# Patient Record
Sex: MALE | Employment: STUDENT | URBAN - METROPOLITAN AREA
[De-identification: names, ages, dates, MRNs, and addresses within clinical notes are randomized per-mention and may not be internally consistent; named-entity substitution may affect disease eponyms.]

---
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
## 2025-01-13 ENCOUNTER — OFFICE VISIT (OUTPATIENT)
Dept: ORTHOPEDIC SURGERY | Facility: HOSPITAL | Age: 20
End: 2025-01-13
Payer: COMMERCIAL

## 2025-01-13 DIAGNOSIS — S86.011D RUPTURE OF RIGHT ACHILLES TENDON, SUBSEQUENT ENCOUNTER: Primary | ICD-10-CM

## 2025-01-13 PROCEDURE — 99211 OFF/OP EST MAY X REQ PHY/QHP: CPT | Performed by: PHYSICIAN ASSISTANT

## 2025-01-13 PROCEDURE — 1036F TOBACCO NON-USER: CPT | Performed by: PHYSICIAN ASSISTANT

## 2025-01-13 ASSESSMENT — PAIN - FUNCTIONAL ASSESSMENT: PAIN_FUNCTIONAL_ASSESSMENT: 0-10

## 2025-01-13 ASSESSMENT — PAIN SCALES - GENERAL: PAINLEVEL_OUTOF10: 1

## 2025-01-13 NOTE — PROGRESS NOTES
Subjective    Patient ID: Bran Yang is a 19 y.o. male.    Procedure: Right Achilles tendon repair  Date of surgery: 11/7/2024      HPI:  Bran Yang is a 19 y.o. male who is status post 9 weeks right Achilles tendon repair.  No problems or adverse events reported.  He is now fully weightbearing in the boot without any difficulty.  He reports minimal discomfort.    ROS  Constitutional: No fever, no chills, not feeling tired, no recent weight gain and no recent weight loss  ENT: No nosebleeds  Cardiovascular: No chest pain  Respiratory: No shortness of breath and no cough  Gastrointestinal: No abdominal pain, no nausea, no diarrhea, and no vomiting  Musculoskeletal: No arthralgias  Integumentary: No rashes and no skin lesions  Neurological: No headache  Psychiatric: No sleep disturbances no depression  Endocrine: No muscle weakness and no muscle cramps  Hematologic/lymphatic: No swelling glands and no tendency for easy bruising      Objective   19-year-old male well appearing in no acute distress. Alert and oriented ×3.  Skin intact bilateral lower extremities.   Normal tandem gait. Coordination and balance intact.  Bilateral lower extremity compartments supple.  5 out of 5 distal motor strength bilaterally.  L4 through S1 sensation intact bilaterally.  2+ DP/PT pulses bilaterally.  The incision over the right Achilles is healing nicely with minimal scarring.  The repair itself is palpable and intact.  Negative Bui sign.        Assessment/Plan   Encounter Diagnoses:  Rupture of right Achilles tendon, subsequent encounter    No orders of the defined types were placed in this encounter.      He may discontinue the boot.  I recommend that he use tennis shoes consistently.  He can place gel heel cups in both shoes.  He may have a little bit of increased soreness when transitioning out of the boot.  He can use ice and over-the-counter medicines.  He is scheduled to start physical therapy this week.   Avoid dorsiflexion past neutral until least 3 months postop.  Will see him back in follow-up in 2 months.    This office note was dictated using Dragon voice to text software and was not proofread for spelling or grammatical errors

## 2025-01-15 ENCOUNTER — EVALUATION (OUTPATIENT)
Dept: PHYSICAL THERAPY | Facility: CLINIC | Age: 20
End: 2025-01-15
Payer: COMMERCIAL

## 2025-01-15 DIAGNOSIS — S86.011D RUPTURE OF RIGHT ACHILLES TENDON, SUBSEQUENT ENCOUNTER: Primary | ICD-10-CM

## 2025-01-15 DIAGNOSIS — R29.898 ANKLE WEAKNESS: ICD-10-CM

## 2025-01-15 DIAGNOSIS — M25.671 STIFFNESS OF RIGHT ANKLE JOINT: ICD-10-CM

## 2025-01-15 PROCEDURE — 97110 THERAPEUTIC EXERCISES: CPT | Mod: GP

## 2025-01-15 PROCEDURE — 97161 PT EVAL LOW COMPLEX 20 MIN: CPT | Mod: GP

## 2025-01-15 NOTE — PROGRESS NOTES
Physical Therapy  Physical Therapy Orthopedic Evaluation    Patient Name: Bran Yang  MRN: [id number]  Today's Date: 1/15/2025    Insurance:  Visit number: 1 of [place]  Authorization info: No  Insurance Type: Payor: [employer] [facility] / Plan: [employer] / Product Type: *No Product type* /    Current Problem  Problem List Items Addressed This Visit             ICD-10-CM    Achilles rupture, right S86.011A    Stiffness of right ankle joint M25.671    Ankle weakness - Primary R29.898     General:  General  Reason for Referral: R achilles repair  Referred By: Dr. Ha  General Comment: DOS: 11/7/24  Precautions:     Medical History Form: Reviewed (scanned into chart)    Subjective:   MADELYN: Pt goes by Darvin. Pt reports to evaluation today 10 weeks post op of R achiles tendon repair. Pt was playing C4M ball when he felt a pop in the back of his calf. Pt is currently walking without a a boot but has an antalgic gait pattern and locking out his knee. He has not been having any pain but it will get achy from time to time when he is sitting for long periods of time. His incision is healing nicely with no swelling.     Previous Med Management: rest, Ice prior to surgery    PMH: Nothing related to his ankle     Aggravating Factors: putting pressure in his heel and going into dorsiflexion    Relieving Factors: rest     Pain/Symptom Scale:  Current: 0/10  Worst: 5/10  Best: 0/10  Location/Nature: Posterior ankle and describes as a dull ache   Meds: None     PLOF: Prior to surgery pt had no problems   ADL: Pt has to be cautious when showering   Work: Works for a  company   Athletics: Pickle ball and workout routine   Recreation/ Hobbies: Video games and legos     Goals: Pt would like to get back to all activities     Language: English    Objective:     Palpation / Observation   Slight increase in tenderness upon palpation of R achilles tendon. Incision healing nicely. No swelling noted. Pt  ambulating out of boot with antalgic gait pattern.     Ankle ROM   DF- R: 0 L: 20  PF- R: 30 L: 30  Eversion - R: 15 L: 20   Inversion - R: 20 L: 30    Ankle MMT  DF- R: Not tested due to recent surgery L: 5/5  PF- R: Not tested due to recent surgery L: 5/5  Eversion - R: Not tested due to recent surgery L: 5/5   Inversion - R: Not tested due to recent surgery L: 5/5    Outcome Measures:  Other Measures  Lower Extremity Funtional Score (LEFS): 21/80     Treatments:  Access Code: [email or web address]  URL: [email or web address]  Date: 01/15/2025  Prepared by: Tigre Kwong    Exercises  - Isometric Ankle Dorsiflexion and Plantarflexion  - 1 x daily - 7 x weekly - 3 sets - 10 reps  - Isometric Ankle Inversion at Wall  - 1 x daily - 7 x weekly - 3 sets - 10 reps  - Isometric Ankle Eversion at Wall  - 1 x daily - 7 x weekly - 3 sets - 10 reps  - Long Sitting Isometric Ankle Plantarflexion with Ball at Wall  - 1 x daily - 7 x weekly - 3 sets - 10 reps  - Standing Hip Abduction AROM  - 1 x daily - 7 x weekly - 3 sets - 10 reps  - Towel Scrunches  - 1 x daily - 7 x weekly - 3 sets - 10 reps    EDUCATION: Home exercise program, plan of care, activity modifications, pain management, and injury pathology       Assessment:     Patient is a 19 year old male that presents to physical therapy evaluation today 10 weeks post op of R achilles tendon repair. Patient impairments include flexibility deficits of R ankle, strength deficits in R LE musculature, and motor control deficits including lack of ankle control when ambulating. Due to these impairments, the patients has the following functional limitations: pain with sitting for long periods of time, difficulty walking with non antalgic gait pattern, and an overall decrease in functional mobility. Skilled therapy recommended in order to to address their impairments and progress towards the associated functional goals. Prognosis is excellent secondary to their current  presentation and client understanding. The pt demonstrates a good understanding of their current plan of care, rehab expectations, and prognosis.          Plan:     Planned Interventions include: therapeutic exercise, self-care home management, manual therapy, therapeutic activities, gait training, neuromuscular coordination, vasopneumatic, dry needling, aquatic therapy  Frequency: 1-2 x Week  Duration: 12 Weeks  Goals: Set and discussed today  Active       PT Problem       PT Goals       Start:  01/15/25       1.  Patient will increase LEFS to 65 out of 80 or greater in order to demonstrate an increase in ankle function  2.  Patient will increase all hip and ankle manual muscle tests to 4+ out of 5 or greater in order to demonstrate an increase in lower extremity strength.  3.  Patient will increase right ankle ROM to be equal to or greater than the L ankle in order to demonstrate an increase in functional mobility.  4.  Patient will be able to perform 15 or more heel raises without upper extremity support in order to demonstrate an increase in functional strength.  5.  Patient will be able to walk 0.5 of a mile with no increase in right ankle/achilles pain in order to demonstrate progress towards prior level of function.  6.  Patient will perform home exercise program independently and compliantly               Plan of care was developed with input and agreement by the patient  Ambulatory Screenings Summary       Screening  Frequency  Date Last Completed   Spiritual and Cultural Beliefs   Screening each visit or episode of care    Falls Risk Screening every ambulatory visit    Pain Screening annually at primary care visit  10/28/2024   Domestic Violence screening annually at primary care visit    Depression Screening annually in the primary care setting    Suicide Risk Screening annually in the primary care setting    Nutrition and Food Insecurity   Screening at least annually at primary care visit     Key Learner  annually in the primary care setting    Drug Screen  1/13/2025 10:16 AM   Alcohol Screen  1/13/2025 10:16 AM   Advance Directive       Time Calculation  Start Time: 0845  Stop Time: 0930  Time Calculation (min): 45 min  PT Evaluation Time Entry  PT Evaluation (Low) Time Entry: 25 PT Therapeutic Procedures Time Entry  Therapeutic Exercise Time Entry: 15

## 2025-01-17 ENCOUNTER — TREATMENT (OUTPATIENT)
Dept: PHYSICAL THERAPY | Facility: CLINIC | Age: 20
End: 2025-01-17
Payer: COMMERCIAL

## 2025-01-17 DIAGNOSIS — S86.011D RUPTURE OF RIGHT ACHILLES TENDON, SUBSEQUENT ENCOUNTER: Primary | ICD-10-CM

## 2025-01-17 DIAGNOSIS — M25.671 STIFFNESS OF RIGHT ANKLE JOINT: ICD-10-CM

## 2025-01-17 DIAGNOSIS — R29.898 ANKLE WEAKNESS: ICD-10-CM

## 2025-01-17 PROCEDURE — 97110 THERAPEUTIC EXERCISES: CPT | Mod: GP

## 2025-01-17 PROCEDURE — 97140 MANUAL THERAPY 1/> REGIONS: CPT | Mod: GP

## 2025-01-17 ASSESSMENT — PAIN SCALES - GENERAL: PAINLEVEL_OUTOF10: 0 - NO PAIN

## 2025-01-17 ASSESSMENT — PAIN - FUNCTIONAL ASSESSMENT: PAIN_FUNCTIONAL_ASSESSMENT: 0-10

## 2025-01-17 NOTE — PROGRESS NOTES
"Physical Therapy  Physical Therapy Treatment    Patient Name: Bran Yang  MRN: [id number]  Today's Date: 1/17/2025    Insurance:  Visit number: 2 of MN  Authorization info: No  Insurance Type: Payor: [facility] / Plan: [employer] / Product Type: *No Product type* /    Current Problem  Problem List Items Addressed This Visit             ICD-10-CM    Achilles rupture, right - Primary S86.011A    Stiffness of right ankle joint M25.671    Ankle weakness R29.898     General:  General  Reason for Referral: R achilles repair  Referred By: Dr. Ha  General Comment: DOS: 11/7/24  Precautions:     Medical History Form: Reviewed (scanned into chart)    Subjective:   Patient reports he is feeling ok overall. Continuing to work on his gait pattern since weaning out of the boot. Has noticed some arch pain/tightness as well as toe spasms the past few days.     Objective:     Palpation / Observation   Slight increase in tenderness upon palpation of R achilles tendon. Incision healing nicely. No swelling noted. Pt ambulating out of boot with antalgic gait pattern.     Ankle ROM   DF- R: 0 L: 20  PF- R: 30 L: 30  Eversion - R: 15 L: 20   Inversion - R: 20 L: 30    Ankle MMT  DF- R: Not tested due to recent surgery L: 5/5  PF- R: Not tested due to recent surgery L: 5/5  Eversion - R: Not tested due to recent surgery L: 5/5   Inversion - R: Not tested due to recent surgery L: 5/5    Outcome Measures:        Treatments:    Manual Therapy: 20'  STM gastroc/soleus  Trp gastroc/soleus  PROM to tolerance not aggressive DF  TCJ grade 1-2 AP glides   TCJ distraction  Subtalar distraction w/ inversion/eversion wiggle to tolerance    There-ex: 25'  Toe yoga 2x10 alt greater and lesser toe lifts each*  Seated heel raises 3x10  DF self mobs to 4\" step to tolerance not aggressive w/ 1-2\" holds 2x20*  Mini squats w/ heel wedge 2x10  Semi-tandem stance cross body reaches to plinth 2x20    Access Code: " [email or web address]  URL: [email or web address]  Date: 01/15/2025  Prepared by: Tigre Kwong    EDUCATION: Home exercise program, plan of care, activity modifications, pain management, and injury pathology       Assessment:   Patient tolerated today's session w/ a reported reduction in tightness and improved gait at the conclusion of session. He was able to ambulate w/o external rotating his affected LE. Patient did well w/ new exercises introduced in today's session. He will benefit from ongoing PT services to continue to progress ankle ROM, as well as LE strengthening activities as tolerated per post-op achilles repair protocol to reach established goals to return to PLOF.        Plan:   Assess response to previous session. Continue manual, ROM, strengthening and balance exercises as tolerated. Trial of ankle isotonics and blaze pod balance if appropriate.     Goals: Set and discussed today  Active       PT Problem       PT Goals       Start:  01/15/25       1.  Patient will increase LEFS to 65 out of 80 or greater in order to demonstrate an increase in ankle function  2.  Patient will increase all hip and ankle manual muscle tests to 4+ out of 5 or greater in order to demonstrate an increase in lower extremity strength.  3.  Patient will increase right ankle ROM to be equal to or greater than the L ankle in order to demonstrate an increase in functional mobility.  4.  Patient will be able to perform 15 or more heel raises without upper extremity support in order to demonstrate an increase in functional strength.  5.  Patient will be able to walk 0.5 of a mile with no increase in right ankle/achilles pain in order to demonstrate progress towards prior level of function.  6.  Patient will perform home exercise program independently and compliantly               Plan of care was developed with input and agreement by the patient  Ambulatory Screenings Summary       Screening  Frequency  Date Last  Completed   Spiritual and Cultural Beliefs   Screening each visit or episode of care    Falls Risk Screening every ambulatory visit    Pain Screening annually at primary care visit  10/28/2024   Domestic Violence screening annually at primary care visit    Depression Screening annually in the primary care setting    Suicide Risk Screening annually in the primary care setting    Nutrition and Food Insecurity   Screening at least annually at primary care visit     Key Learner annually in the primary care setting    Drug Screen  1/13/2025 10:16 AM   Alcohol Screen  1/13/2025 10:16 AM   Advance Directive       Time Calculation  Start Time: 1115  Stop Time: 1200  Time Calculation (min): 45 min    PT Therapeutic Procedures Time Entry  Manual Therapy Time Entry: 20  Therapeutic Exercise Time Entry: 25

## 2025-01-21 ENCOUNTER — TREATMENT (OUTPATIENT)
Dept: PHYSICAL THERAPY | Facility: CLINIC | Age: 20
End: 2025-01-21
Payer: COMMERCIAL

## 2025-01-21 DIAGNOSIS — S86.011D RUPTURE OF RIGHT ACHILLES TENDON, SUBSEQUENT ENCOUNTER: Primary | ICD-10-CM

## 2025-01-21 DIAGNOSIS — M25.671 STIFFNESS OF RIGHT ANKLE JOINT: ICD-10-CM

## 2025-01-21 DIAGNOSIS — R29.898 ANKLE WEAKNESS: ICD-10-CM

## 2025-01-21 PROCEDURE — 97110 THERAPEUTIC EXERCISES: CPT | Mod: GP

## 2025-01-21 PROCEDURE — 97140 MANUAL THERAPY 1/> REGIONS: CPT | Mod: GP

## 2025-01-21 NOTE — PROGRESS NOTES
Physical Therapy  Physical Therapy Treatment    Patient Name: Bran Yang  MRN: [id number]  Today's Date: 1/21/2025    Insurance:  Visit number: 2 of MN  Authorization info: No  Insurance Type: Payor: [facility] / Plan: [facility] [employer] / Product Type: *No Product type* /    Current Problem  Problem List Items Addressed This Visit             ICD-10-CM    Achilles rupture, right - Primary S86.011A    Stiffness of right ankle joint M25.671    Ankle weakness R29.898     General:  General  Reason for Referral: R achilles repair  Referred By: Dr. Ha  General Comment: DOS: 11/7/24  Precautions:     Medical History Form: Reviewed (scanned into chart)    Subjective:   Patient has been having some gastroc/posterior ankle pain. He has been walking with a better gait pattern. HEP is going well.     Objective:     Palpation / Observation   Slight increase in tenderness upon palpation of R achilles tendon. Incision healing nicely. No swelling noted. Pt ambulating out of boot with antalgic gait pattern.     Ankle ROM   DF- R: 0 L: 20  PF- R: 30 L: 30  Eversion - R: 15 L: 20   Inversion - R: 20 L: 30    Ankle MMT  DF- R: Not tested due to recent surgery L: 5/5  PF- R: Not tested due to recent surgery L: 5/5  Eversion - R: Not tested due to recent surgery L: 5/5   Inversion - R: Not tested due to recent surgery L: 5/5    Outcome Measures:        Treatments:    Manual Therapy: 20'  STM gastroc/soleus  Trp gastroc/soleus  PROM to tolerance not aggressive DF  TCJ grade 1-2 AP glides   TCJ distraction  Subtalar distraction w/ inversion/eversion wiggle to tolerance    There-ex: 25'  Long sitting DF stretch 3 x 40 sec   Banded DF (red) 2 x 10   Banded PF (red) 2 x 10   Banded eversion (red) 2 x 10   5 min upright bike     Access Code: [email or web address]  URL: [email or web address]  Date: 01/15/2025  Prepared by: Tigre Kwong    EDUCATION: Home exercise program, plan of care, activity  modifications, pain management, and injury pathology       Assessment:   Patient tolerated today's session well. Pt was able to continue to work on DF flexibility with long sustained holds. Pt was also able to begin light banded isotonics without any pain. Pt continues to progress towards goals established in the POC and should continue with skilled therapy.         Plan:   Assess response to previous session. Continue manual, ROM, strengthening and balance exercises as tolerated. Trial of ankle isotonics and blaze pod balance if appropriate.     Goals: Set and discussed today  Active       PT Problem       PT Goals       Start:  01/15/25       1.  Patient will increase LEFS to 65 out of 80 or greater in order to demonstrate an increase in ankle function  2.  Patient will increase all hip and ankle manual muscle tests to 4+ out of 5 or greater in order to demonstrate an increase in lower extremity strength.  3.  Patient will increase right ankle ROM to be equal to or greater than the L ankle in order to demonstrate an increase in functional mobility.  4.  Patient will be able to perform 15 or more heel raises without upper extremity support in order to demonstrate an increase in functional strength.  5.  Patient will be able to walk 0.5 of a mile with no increase in right ankle/achilles pain in order to demonstrate progress towards prior level of function.  6.  Patient will perform home exercise program independently and compliantly               Plan of care was developed with input and agreement by the patient  Ambulatory Screenings Summary       Screening  Frequency  Date Last Completed   Spiritual and Cultural Beliefs   Screening each visit or episode of care    Falls Risk Screening every ambulatory visit    Pain Screening annually at primary care visit  10/28/2024   Domestic Violence screening annually at primary care visit    Depression Screening annually in the primary care setting    Suicide Risk Screening  annually in the primary care setting    Nutrition and Food Insecurity   Screening at least annually at primary care visit     Key Learner annually in the primary care setting    Drug Screen  1/13/2025 10:16 AM   Alcohol Screen  1/13/2025 10:16 AM   Advance Directive       Time Calculation  Start Time: 0845  Stop Time: 0930  Time Calculation (min): 45 min    PT Therapeutic Procedures Time Entry  Manual Therapy Time Entry: 25  Therapeutic Exercise Time Entry: 16

## 2025-01-23 ENCOUNTER — TREATMENT (OUTPATIENT)
Dept: PHYSICAL THERAPY | Facility: CLINIC | Age: 20
End: 2025-01-23
Payer: COMMERCIAL

## 2025-01-23 DIAGNOSIS — S86.011D RUPTURE OF RIGHT ACHILLES TENDON, SUBSEQUENT ENCOUNTER: Primary | ICD-10-CM

## 2025-01-23 DIAGNOSIS — M25.671 STIFFNESS OF RIGHT ANKLE JOINT: ICD-10-CM

## 2025-01-23 DIAGNOSIS — R29.898 ANKLE WEAKNESS: ICD-10-CM

## 2025-01-23 PROCEDURE — 97140 MANUAL THERAPY 1/> REGIONS: CPT | Mod: GP

## 2025-01-23 PROCEDURE — 97110 THERAPEUTIC EXERCISES: CPT | Mod: GP

## 2025-01-23 ASSESSMENT — PAIN - FUNCTIONAL ASSESSMENT: PAIN_FUNCTIONAL_ASSESSMENT: 0-10

## 2025-01-23 ASSESSMENT — PAIN SCALES - GENERAL: PAINLEVEL_OUTOF10: 3

## 2025-01-23 NOTE — PROGRESS NOTES
"Physical Therapy  Physical Therapy Treatment    Patient Name: Bran Yang  MRN: [id number]  Today's Date: 1/23/2025    Insurance:  Visit number: 4 of [place]  Authorization info: No  Insurance Type: Payor: [facility] / Plan: [employer] / Product Type: *No Product type* /    Current Problem  Problem List Items Addressed This Visit             ICD-10-CM    Achilles rupture, right - Primary S86.011A    Stiffness of right ankle joint M25.671    Ankle weakness R29.898     General:  General  Reason for Referral: R achilles repair  Referred By: Dr. Ha  General Comment: DOS: 11/7/24  Precautions:     Medical History Form: Reviewed (scanned into chart)    Subjective:   Patient reports he is feeling ok today. Had to walk outside for class today and has been having some sharp discomfort in his achilles today.     Objective:     Pain: 2-3, more sharp today from walking outside.    Palpation / Observation   Slight increase in tenderness upon palpation of R achilles tendon. Incision healing nicely. No swelling noted. Pt ambulating out of boot with antalgic gait pattern.     Ankle ROM   DF- R: 2-3 L: 20 updated 1/23 post session  PF- R: 30 L: 30  Eversion - R: 15 L: 20   Inversion - R: 20 L: 30    Ankle MMT  DF- R: Not tested due to recent surgery L: 5/5  PF- R: Not tested due to recent surgery L: 5/5  Eversion - R: Not tested due to recent surgery L: 5/5   Inversion - R: Not tested due to recent surgery L: 5/5    Outcome Measures:        Treatments:    Manual Therapy: 25'  STM gastroc/soleus  Trp gastroc/soleus  TCJ grade 3-4 AP glides   TCJ distraction  IASTM achilles tendon w/ Graston tool   Achilles motility to tolerance    There-ex: 25'  Upright bike x5'   Long sitting gastroc soleus stretch 2x30\"  Long sitting ankle eversion w/ green perform better TB 2x10  Ankle DF self mobs to plinth w/ 10\" holds x6*  Slant board gastroc stretch w/ 15\" holds x4, knee flexed and extended to tolerance  Slant " "board squats w/ 10# KB goblet hold 3x12  Knee extension isometrics in seated x30\"*    * = added to HEP.  Sheet with exercise descriptions and images issued.  Skilled intervention utilized in the appropriate selection & application of above exercises.  Verbal and tactile cues provided for proper form and technique.  Pt. demonstrated appropriate form & verbalized understanding of optimal technique for above exercises.      Access Code: [email or web address]  URL: [email or web address]  Date: 01/15/2025  Prepared by: Tigre Kwong    EDUCATION: Home exercise program, plan of care, activity modifications, pain management, and injury pathology       Assessment:   Patient tolerated today's session w/ a reported reduction in familiar symptoms and improved gait at the conclusion of session. Demonstrates improvements in DF ROM quality. Had difficulty w/ squats due to weight shifting off of surgical side, which was able to improve w/ visual and verbal cues. Has also been experiencing some patellar tendon pain on ipsilateral side, likely due to hyperextending his knee during gait compensations. Patient will benefit from ongoing PT services to continue to progress ankle mobility, as well as LE strengthening as tolerated per post-op protocol to reach established goals to return to PLOF.          Plan:   Continue manual and mobility work. Progress balance and hip strengthening activities as tolerated. Reassess patellar tendon pain and response to isometrics.     Goals: Set and discussed today  Active       PT Problem       PT Goals       Start:  01/15/25       1.  Patient will increase LEFS to 65 out of 80 or greater in order to demonstrate an increase in ankle function  2.  Patient will increase all hip and ankle manual muscle tests to 4+ out of 5 or greater in order to demonstrate an increase in lower extremity strength.  3.  Patient will increase right ankle ROM to be equal to or greater than the L ankle in order to " demonstrate an increase in functional mobility.  4.  Patient will be able to perform 15 or more heel raises without upper extremity support in order to demonstrate an increase in functional strength.  5.  Patient will be able to walk 0.5 of a mile with no increase in right ankle/achilles pain in order to demonstrate progress towards prior level of function.  6.  Patient will perform home exercise program independently and compliantly               Plan of care was developed with input and agreement by the patient  Ambulatory Screenings Summary       Screening  Frequency  Date Last Completed   Spiritual and Cultural Beliefs   Screening each visit or episode of care    Falls Risk Screening every ambulatory visit    Pain Screening annually at primary care visit  10/28/2024   Domestic Violence screening annually at primary care visit    Depression Screening annually in the primary care setting    Suicide Risk Screening annually in the primary care setting    Nutrition and Food Insecurity   Screening at least annually at primary care visit     Key Learner annually in the primary care setting    Drug Screen  1/13/2025 10:16 AM   Alcohol Screen  1/13/2025 10:16 AM   Advance Directive       Time Calculation  Start Time: 1355  Stop Time: 1445  Time Calculation (min): 50 min    PT Therapeutic Procedures Time Entry  Manual Therapy Time Entry: 25  Therapeutic Exercise Time Entry: 25

## 2025-01-28 ENCOUNTER — TREATMENT (OUTPATIENT)
Dept: PHYSICAL THERAPY | Facility: CLINIC | Age: 20
End: 2025-01-28
Payer: COMMERCIAL

## 2025-01-28 DIAGNOSIS — R29.898 ANKLE WEAKNESS: ICD-10-CM

## 2025-01-28 DIAGNOSIS — S86.011D RUPTURE OF RIGHT ACHILLES TENDON, SUBSEQUENT ENCOUNTER: Primary | ICD-10-CM

## 2025-01-28 DIAGNOSIS — M25.671 STIFFNESS OF RIGHT ANKLE JOINT: ICD-10-CM

## 2025-01-28 PROCEDURE — 97110 THERAPEUTIC EXERCISES: CPT | Mod: GP

## 2025-01-28 PROCEDURE — 97140 MANUAL THERAPY 1/> REGIONS: CPT | Mod: GP

## 2025-01-28 NOTE — PROGRESS NOTES
"Physical Therapy  Physical Therapy Treatment    Patient Name: Bran Yang  MRN: [id number]  Today's Date: 1/28/2025    Insurance:  Visit number: 5 of MN  Authorization info: No  Insurance Type: Payor: [employer] [facility] / Plan: [employer] / Product Type: *No Product type* /    Current Problem  Problem List Items Addressed This Visit             ICD-10-CM    Achilles rupture, right - Primary S86.011A    Stiffness of right ankle joint M25.671    Ankle weakness R29.898     General:  General  Reason for Referral: R achilles repair  Referred By: Dr. Ha  General Comment: DOS: 11/7/24  Precautions:     Medical History Form: Reviewed (scanned into chart)    Subjective:   Patient's walk continues to improve and he is having less discomfort when in standing   Objective:     Pain: 2-3, more sharp today from walking outside.    Palpation / Observation   Slight increase in tenderness upon palpation of R achilles tendon. Incision healing nicely. No swelling noted. Pt ambulating out of boot with antalgic gait pattern.     Ankle ROM   DF- R: 2-3 L: 20 updated 1/23 post session  PF- R: 30 L: 30  Eversion - R: 15 L: 20   Inversion - R: 20 L: 30    Ankle MMT  DF- R: Not tested due to recent surgery L: 5/5  PF- R: Not tested due to recent surgery L: 5/5  Eversion - R: Not tested due to recent surgery L: 5/5   Inversion - R: Not tested due to recent surgery L: 5/5    Outcome Measures:        Treatments:    Manual Therapy: 25'  STM gastroc/soleus  Trp gastroc/soleus  TCJ grade 3-4 AP glides   TCJ distraction  IASTM achilles tendon w/ Graston tool   Achilles motility to tolerance    There-ex: 25'  Upright bike x5'   12\" box DF stretch 2 x 10   Soleus isometric 5 x 30 sec   Staggered stance heel raise with UE support 2 x 10     * = added to HEP.  Sheet with exercise descriptions and images issued.  Skilled intervention utilized in the appropriate selection & application of above exercises.  Verbal and tactile " cues provided for proper form and technique.  Pt. demonstrated appropriate form & verbalized understanding of optimal technique for above exercises.      Access Code: [email or web address]  URL: [email or web address]  Date: 01/15/2025  Prepared by: Tigre Kwong    EDUCATION: Home exercise program, plan of care, activity modifications, pain management, and injury pathology       Assessment:   Patient tolerated today's session well. Pt continues to gain ankle DF each week. Pt was able to continue to progress strengthening of soleus and gastroc in standing. Pt did not some soreness and fatigue by the end of today's session. Pt continues to progress towards goals established in the POC and should continue with skilled therapy.           Plan:   Continue manual and mobility work. Progress balance and hip strengthening activities as tolerated. Reassess patellar tendon pain and response to isometrics.     Goals: Set and discussed today  Active       PT Problem       PT Goals       Start:  01/15/25       1.  Patient will increase LEFS to 65 out of 80 or greater in order to demonstrate an increase in ankle function  2.  Patient will increase all hip and ankle manual muscle tests to 4+ out of 5 or greater in order to demonstrate an increase in lower extremity strength.  3.  Patient will increase right ankle ROM to be equal to or greater than the L ankle in order to demonstrate an increase in functional mobility.  4.  Patient will be able to perform 15 or more heel raises without upper extremity support in order to demonstrate an increase in functional strength.  5.  Patient will be able to walk 0.5 of a mile with no increase in right ankle/achilles pain in order to demonstrate progress towards prior level of function.  6.  Patient will perform home exercise program independently and compliantly               Plan of care was developed with input and agreement by the patient  Ambulatory Screenings Summary        Screening  Frequency  Date Last Completed   Spiritual and Cultural Beliefs   Screening each visit or episode of care    Falls Risk Screening every ambulatory visit    Pain Screening annually at primary care visit  10/28/2024   Domestic Violence screening annually at primary care visit    Depression Screening annually in the primary care setting    Suicide Risk Screening annually in the primary care setting    Nutrition and Food Insecurity   Screening at least annually at primary care visit     Key Learner annually in the primary care setting    Drug Screen  1/13/2025 10:16 AM   Alcohol Screen  1/13/2025 10:16 AM   Advance Directive       Time Calculation  Start Time: 0845  Stop Time: 0930  Time Calculation (min): 45 min    PT Therapeutic Procedures Time Entry  Manual Therapy Time Entry: 25  Therapeutic Exercise Time Entry: 15

## 2025-01-30 ENCOUNTER — TREATMENT (OUTPATIENT)
Dept: PHYSICAL THERAPY | Facility: CLINIC | Age: 20
End: 2025-01-30
Payer: COMMERCIAL

## 2025-01-30 DIAGNOSIS — S86.011D RUPTURE OF RIGHT ACHILLES TENDON, SUBSEQUENT ENCOUNTER: Primary | ICD-10-CM

## 2025-01-30 DIAGNOSIS — M25.671 STIFFNESS OF RIGHT ANKLE JOINT: ICD-10-CM

## 2025-01-30 DIAGNOSIS — R29.898 ANKLE WEAKNESS: ICD-10-CM

## 2025-01-30 PROCEDURE — 97140 MANUAL THERAPY 1/> REGIONS: CPT | Mod: GP

## 2025-01-30 PROCEDURE — 97110 THERAPEUTIC EXERCISES: CPT | Mod: GP

## 2025-01-30 NOTE — PROGRESS NOTES
Physical Therapy  Physical Therapy Treatment    Patient Name: Bran Yang  MRN: [id number]  Today's Date: 1/30/2025    Insurance:  Visit number: 6 of MN  Authorization info: No  Insurance Type: Payor: [employer] [facility] / Plan: [email or web address] / Product Type: *No Product type* /    Current Problem  Problem List Items Addressed This Visit             ICD-10-CM    Achilles rupture, right - Primary S86.011A    Stiffness of right ankle joint M25.671    Ankle weakness R29.898     General:  General  Reason for Referral: R achilles repair  Referred By: Dr. Ha  General Comment: DOS: 11/7/24  Precautions:     Medical History Form: Reviewed (scanned into chart)    Subjective:   Patient continues to have a better gait pattern each week. Pt is getting to neutral a lot easier and having less pain in his calf.   Objective:     Pain: 2-3, more sharp today from walking outside.    Palpation / Observation   Slight increase in tenderness upon palpation of R achilles tendon. Incision healing nicely. No swelling noted. Pt ambulating out of boot with antalgic gait pattern.     Ankle ROM   DF- R: 2-3 L: 20 updated 1/23 post session  PF- R: 30 L: 30  Eversion - R: 15 L: 20   Inversion - R: 20 L: 30    Ankle MMT  DF- R: Not tested due to recent surgery L: 5/5  PF- R: Not tested due to recent surgery L: 5/5  Eversion - R: Not tested due to recent surgery L: 5/5   Inversion - R: Not tested due to recent surgery L: 5/5    Outcome Measures:        Treatments:    Manual Therapy: 25'  STM gastroc/soleus  Trp gastroc/soleus  TCJ grade 3-4 AP glides   TCJ distraction  IASTM achilles tendon w/ Graston tool   Achilles motility to tolerance    There-ex: 25'  Upright bike x5'   Long sitting DF stretch 3 x 30 sec   BL shuttle press (25#) 3 x 10      * = added to HEP.  Sheet with exercise descriptions and images issued.  Skilled intervention utilized in the appropriate selection & application of above exercises.  Verbal and  tactile cues provided for proper form and technique.  Pt. demonstrated appropriate form & verbalized understanding of optimal technique for above exercises.      Access Code: [email or web address]  URL: [email or web address]  Date: 01/15/2025  Prepared by: Tigre Kwong    EDUCATION: Home exercise program, plan of care, activity modifications, pain management, and injury pathology       Assessment:   Patient tolerated today's session well. Pt continues to show improvement with ankle dorsiflexion. He was able to perform passive stretching with shuttle press today. Pt continues to progress towards goals established in the POC and should continue with skilled therapy.             Plan:   Continue manual and mobility work. Progress balance and hip strengthening activities as tolerated. Reassess patellar tendon pain and response to isometrics.     Goals: Set and discussed today  Active       PT Problem       PT Goals       Start:  01/15/25       1.  Patient will increase LEFS to 65 out of 80 or greater in order to demonstrate an increase in ankle function  2.  Patient will increase all hip and ankle manual muscle tests to 4+ out of 5 or greater in order to demonstrate an increase in lower extremity strength.  3.  Patient will increase right ankle ROM to be equal to or greater than the L ankle in order to demonstrate an increase in functional mobility.  4.  Patient will be able to perform 15 or more heel raises without upper extremity support in order to demonstrate an increase in functional strength.  5.  Patient will be able to walk 0.5 of a mile with no increase in right ankle/achilles pain in order to demonstrate progress towards prior level of function.  6.  Patient will perform home exercise program independently and compliantly               Plan of care was developed with input and agreement by the patient  Ambulatory Screenings Summary       Screening  Frequency  Date Last Completed   Spiritual and  Cultural Beliefs   Screening each visit or episode of care    Falls Risk Screening every ambulatory visit    Pain Screening annually at primary care visit  10/28/2024   Domestic Violence screening annually at primary care visit    Depression Screening annually in the primary care setting    Suicide Risk Screening annually in the primary care setting    Nutrition and Food Insecurity   Screening at least annually at primary care visit     Key Learner annually in the primary care setting    Drug Screen  1/13/2025 10:16 AM   Alcohol Screen  1/13/2025 10:16 AM   Advance Directive       Time Calculation  Start Time: 1445  Stop Time: 1530  Time Calculation (min): 45 min    PT Therapeutic Procedures Time Entry  Manual Therapy Time Entry: 25  Therapeutic Exercise Time Entry: 15

## 2025-02-03 ENCOUNTER — TREATMENT (OUTPATIENT)
Dept: PHYSICAL THERAPY | Facility: CLINIC | Age: 20
End: 2025-02-03
Payer: COMMERCIAL

## 2025-02-03 DIAGNOSIS — R29.898 ANKLE WEAKNESS: ICD-10-CM

## 2025-02-03 DIAGNOSIS — S86.011D RUPTURE OF RIGHT ACHILLES TENDON, SUBSEQUENT ENCOUNTER: Primary | ICD-10-CM

## 2025-02-03 DIAGNOSIS — M25.671 STIFFNESS OF RIGHT ANKLE JOINT: ICD-10-CM

## 2025-02-03 PROCEDURE — 97110 THERAPEUTIC EXERCISES: CPT | Mod: GP

## 2025-02-03 PROCEDURE — 97140 MANUAL THERAPY 1/> REGIONS: CPT | Mod: GP

## 2025-02-03 NOTE — PROGRESS NOTES
"Physical Therapy  Physical Therapy Treatment    Patient Name: Bran Yang  MRN: [id number]  Today's Date: 2/3/2025    Insurance:  Visit number: 7 of MN  Authorization info: No  Insurance Type: Payor: [facility] / Plan: [employer] / Product Type: *No Product type* /    Current Problem  Problem List Items Addressed This Visit             ICD-10-CM    Achilles rupture, right - Primary S86.011A    Stiffness of right ankle joint M25.671    Ankle weakness R29.898     General:  General  Reason for Referral: R achilles repair  Referred By: Dr. Ha  General Comment: DOS: 11/7/24  Precautions:     Medical History Form: Reviewed (scanned into chart)    Subjective:   Patient continues to feel like his is making improvements with his achilles tendon. He is over 12 weeks out now.   Objective:     Pain: 2-3, more sharp today from walking outside.    Palpation / Observation   Slight increase in tenderness upon palpation of R achilles tendon. Incision healing nicely. No swelling noted. Pt ambulating out of boot with antalgic gait pattern.     Ankle ROM   DF- R: 2-3 L: 20 updated 1/23 post session  PF- R: 30 L: 30  Eversion - R: 15 L: 20   Inversion - R: 20 L: 30    Ankle MMT  DF- R: Not tested due to recent surgery L: 5/5  PF- R: Not tested due to recent surgery L: 5/5  Eversion - R: Not tested due to recent surgery L: 5/5   Inversion - R: Not tested due to recent surgery L: 5/5    Outcome Measures:        Treatments:    Manual Therapy: 25'  STM gastroc/soleus  Trp gastroc/soleus  TCJ grade 3-4 AP glides   TCJ distraction  IASTM achilles tendon w/ Graston tool   Achilles motility to tolerance    There-ex: 25'  Upright bike x5'   Long sitting DF stretch 4 x 30 sec   Lunge to 8\" box 2 x 10   TRX BL heel raise 3 x 10     * = added to HEP.  Sheet with exercise descriptions and images issued.  Skilled intervention utilized in the appropriate selection & application of above exercises.  Verbal and " tactile cues provided for proper form and technique.  Pt. demonstrated appropriate form & verbalized understanding of optimal technique for above exercises.      Access Code: [email or web address]  URL: [email or web address]  Date: 01/15/2025  Prepared by: Tgire Kwong    EDUCATION: Home exercise program, plan of care, activity modifications, pain management, and injury pathology       Assessment:   Patient tolerated today's session well. Pt was able to perform lunges to a higher surface with only a slight increase in discomfort. Pt was able to perform heel raises with some light UE assistance. Pt continues to progress towards goals established in the POC and should continue with skilled therapy.             Plan:   Continue manual and mobility work. Progress balance and hip strengthening activities as tolerated. Reassess patellar tendon pain and response to isometrics.     Goals: Set and discussed today  Active       PT Problem       PT Goals       Start:  01/15/25       1.  Patient will increase LEFS to 65 out of 80 or greater in order to demonstrate an increase in ankle function  2.  Patient will increase all hip and ankle manual muscle tests to 4+ out of 5 or greater in order to demonstrate an increase in lower extremity strength.  3.  Patient will increase right ankle ROM to be equal to or greater than the L ankle in order to demonstrate an increase in functional mobility.  4.  Patient will be able to perform 15 or more heel raises without upper extremity support in order to demonstrate an increase in functional strength.  5.  Patient will be able to walk 0.5 of a mile with no increase in right ankle/achilles pain in order to demonstrate progress towards prior level of function.  6.  Patient will perform home exercise program independently and compliantly               Plan of care was developed with input and agreement by the patient  Ambulatory Screenings Summary       Screening  Frequency  Date  Last Completed   Spiritual and Cultural Beliefs   Screening each visit or episode of care    Falls Risk Screening every ambulatory visit    Pain Screening annually at primary care visit  10/28/2024   Domestic Violence screening annually at primary care visit    Depression Screening annually in the primary care setting    Suicide Risk Screening annually in the primary care setting    Nutrition and Food Insecurity   Screening at least annually at primary care visit     Key Learner annually in the primary care setting    Drug Screen  1/13/2025 10:16 AM   Alcohol Screen  1/13/2025 10:16 AM   Advance Directive       Time Calculation  Start Time: 0915  Stop Time: 1000  Time Calculation (min): 45 min    PT Therapeutic Procedures Time Entry  Manual Therapy Time Entry: 25  Therapeutic Exercise Time Entry: 15

## 2025-02-05 ENCOUNTER — TREATMENT (OUTPATIENT)
Dept: PHYSICAL THERAPY | Facility: CLINIC | Age: 20
End: 2025-02-05
Payer: COMMERCIAL

## 2025-02-05 DIAGNOSIS — S86.011D RUPTURE OF RIGHT ACHILLES TENDON, SUBSEQUENT ENCOUNTER: Primary | ICD-10-CM

## 2025-02-05 DIAGNOSIS — R29.898 ANKLE WEAKNESS: ICD-10-CM

## 2025-02-05 DIAGNOSIS — M25.671 STIFFNESS OF RIGHT ANKLE JOINT: ICD-10-CM

## 2025-02-05 PROCEDURE — 97110 THERAPEUTIC EXERCISES: CPT | Mod: GP

## 2025-02-05 PROCEDURE — 97140 MANUAL THERAPY 1/> REGIONS: CPT | Mod: GP

## 2025-02-05 NOTE — PROGRESS NOTES
Physical Therapy  Physical Therapy Treatment    Patient Name: Bran Yang  MRN: [id number]  Today's Date: 2/5/2025    Insurance:  Visit number: 8 of MN  Authorization info: No  Insurance Type: Payor: [employer] [facility] / Plan: [employer] / Product Type: *No Product type* /    Current Problem  Problem List Items Addressed This Visit             ICD-10-CM    Achilles rupture, right - Primary S86.011A    Stiffness of right ankle joint M25.671    Ankle weakness R29.898     General:  General  Reason for Referral: R achilles repair  Referred By: Dr. Ha  General Comment: DOS: 11/7/24  Precautions:     Medical History Form: Reviewed (scanned into chart)    Subjective:   Patient continues to have some mild calf discomfort and weakness but overall is doing well  Objective:     Pain: 2-3, more sharp today from walking outside.    Palpation / Observation   Slight increase in tenderness upon palpation of R achilles tendon. Incision healing nicely. No swelling noted. Pt ambulating out of boot with antalgic gait pattern.     Ankle ROM   DF- R: 2-3 L: 20 updated 1/23 post session  PF- R: 30 L: 30  Eversion - R: 15 L: 20   Inversion - R: 20 L: 30    Ankle MMT  DF- R: Not tested due to recent surgery L: 5/5  PF- R: Not tested due to recent surgery L: 5/5  Eversion - R: Not tested due to recent surgery L: 5/5   Inversion - R: Not tested due to recent surgery L: 5/5    Outcome Measures:        Treatments:    Manual Therapy: 25'  STM gastroc/soleus  Trp gastroc/soleus  TCJ grade 3-4 AP glides   TCJ distraction  Achilles motility to tolerance    There-ex: 15'  Upright bike x5'   TRX squat 2 x 10   3 way slider 3 x 5       * = added to HEP.  Sheet with exercise descriptions and images issued.  Skilled intervention utilized in the appropriate selection & application of above exercises.  Verbal and tactile cues provided for proper form and technique.  Pt. demonstrated appropriate form & verbalized understanding  of optimal technique for above exercises.      Access Code: [email or web address]  URL: [email or web address]  Date: 01/15/2025  Prepared by: Tigre Kwong    EDUCATION: Home exercise program, plan of care, activity modifications, pain management, and injury pathology       Assessment:   Patient tolerated today's session well. Pt continues to make good progress with ankle ROM. Pt was able to perofrm squat activities today without any increase in achilles pain. Pt continues to progress towards goals established in the POC and should continue with skilled therapy.        Plan:   Continue manual and mobility work. Progress balance and hip strengthening activities as tolerated. Reassess patellar tendon pain and response to isometrics.     Goals: Set and discussed today  Active       PT Problem       PT Goals       Start:  01/15/25       1.  Patient will increase LEFS to 65 out of 80 or greater in order to demonstrate an increase in ankle function  2.  Patient will increase all hip and ankle manual muscle tests to 4+ out of 5 or greater in order to demonstrate an increase in lower extremity strength.  3.  Patient will increase right ankle ROM to be equal to or greater than the L ankle in order to demonstrate an increase in functional mobility.  4.  Patient will be able to perform 15 or more heel raises without upper extremity support in order to demonstrate an increase in functional strength.  5.  Patient will be able to walk 0.5 of a mile with no increase in right ankle/achilles pain in order to demonstrate progress towards prior level of function.  6.  Patient will perform home exercise program independently and compliantly               Plan of care was developed with input and agreement by the patient  Ambulatory Screenings Summary       Screening  Frequency  Date Last Completed   Spiritual and Cultural Beliefs   Screening each visit or episode of care    Falls Risk Screening every ambulatory visit    Pain  Screening annually at primary care visit  10/28/2024   Domestic Violence screening annually at primary care visit    Depression Screening annually in the primary care setting    Suicide Risk Screening annually in the primary care setting    Nutrition and Food Insecurity   Screening at least annually at primary care visit     Key Learner annually in the primary care setting    Drug Screen  1/13/2025 10:16 AM   Alcohol Screen  1/13/2025 10:16 AM   Advance Directive       Time Calculation  Start Time: 0930  Stop Time: 1015  Time Calculation (min): 45 min    PT Therapeutic Procedures Time Entry  Manual Therapy Time Entry: 25  Therapeutic Exercise Time Entry: 15

## 2025-02-18 ENCOUNTER — TREATMENT (OUTPATIENT)
Dept: PHYSICAL THERAPY | Facility: CLINIC | Age: 20
End: 2025-02-18
Payer: COMMERCIAL

## 2025-02-18 DIAGNOSIS — M25.671 STIFFNESS OF RIGHT ANKLE JOINT: ICD-10-CM

## 2025-02-18 DIAGNOSIS — S86.011D RUPTURE OF RIGHT ACHILLES TENDON, SUBSEQUENT ENCOUNTER: Primary | ICD-10-CM

## 2025-02-18 DIAGNOSIS — R29.898 ANKLE WEAKNESS: ICD-10-CM

## 2025-02-18 PROCEDURE — 97140 MANUAL THERAPY 1/> REGIONS: CPT | Mod: GP

## 2025-02-18 PROCEDURE — 97110 THERAPEUTIC EXERCISES: CPT | Mod: GP

## 2025-02-18 NOTE — PROGRESS NOTES
Physical Therapy  Physical Therapy Treatment    Patient Name: Bran Yang  MRN: [id number]  Today's Date: 2/18/2025    Insurance:  Visit number: 9 of MN  Authorization info: No  Insurance Type: Payor: [employer] [facility] / Plan: [employer] / Product Type: *No Product type* /    Current Problem  Problem List Items Addressed This Visit             ICD-10-CM    Achilles rupture, right - Primary S86.011A    Stiffness of right ankle joint M25.671    Ankle weakness R29.898     General:  General  Reason for Referral: R achilles repair  Referred By: Dr. Ha  General Comment: DOS: 11/7/24  Precautions:     Medical History Form: Reviewed (scanned into chart)    Subjective:   Patient has continued to have minimal pain in his ankle besides when he took a miss step.   Objective:     Pain: 2-3, more sharp today from walking outside.    Palpation / Observation   Slight increase in tenderness upon palpation of R achilles tendon. Incision healing nicely. No swelling noted. Pt ambulating out of boot with antalgic gait pattern.     Ankle ROM   DF- R: 2-3 L: 20 updated 1/23 post session  PF- R: 30 L: 30  Eversion - R: 15 L: 20   Inversion - R: 20 L: 30    Ankle MMT  DF- R: Not tested due to recent surgery L: 5/5  PF- R: Not tested due to recent surgery L: 5/5  Eversion - R: Not tested due to recent surgery L: 5/5   Inversion - R: Not tested due to recent surgery L: 5/5    Outcome Measures:        Treatments:    Manual Therapy: 25'  STM gastroc/soleus  Trp gastroc/soleus  TCJ grade 3-4 AP glides   TCJ distraction  Achilles motility to tolerance    There-ex: 15'  Upright bike x5'   Reverse slider 3 x 10   BW squats 3 x 10   SL stance with 4 way blaze pod taps 3 x 30 sec       * = added to HEP.  Sheet with exercise descriptions and images issued.  Skilled intervention utilized in the appropriate selection & application of above exercises.  Verbal and tactile cues provided for proper form and technique.  Pt.  demonstrated appropriate form & verbalized understanding of optimal technique for above exercises.      Access Code: [email or web address]  URL: [email or web address]  Date: 01/15/2025  Prepared by: Tigre Kwong    EDUCATION: Home exercise program, plan of care, activity modifications, pain management, and injury pathology       Assessment:   Patient tolerated today's session well. Pt was able to continue to progress all exercises in standing position. Pt has good ROM with all exercises but did have some fatigue after performing SL exercises. Pt continues to progress towards goals established in the POC and should continue with skilled therapy.          Plan:   Continue manual and mobility work. Progress balance and hip strengthening activities as tolerated. Reassess patellar tendon pain and response to isometrics.     Goals: Set and discussed today  Active       PT Problem       PT Goals       Start:  01/15/25       1.  Patient will increase LEFS to 65 out of 80 or greater in order to demonstrate an increase in ankle function  2.  Patient will increase all hip and ankle manual muscle tests to 4+ out of 5 or greater in order to demonstrate an increase in lower extremity strength.  3.  Patient will increase right ankle ROM to be equal to or greater than the L ankle in order to demonstrate an increase in functional mobility.  4.  Patient will be able to perform 15 or more heel raises without upper extremity support in order to demonstrate an increase in functional strength.  5.  Patient will be able to walk 0.5 of a mile with no increase in right ankle/achilles pain in order to demonstrate progress towards prior level of function.  6.  Patient will perform home exercise program independently and compliantly               Plan of care was developed with input and agreement by the patient  Ambulatory Screenings Summary       Screening  Frequency  Date Last Completed   Spiritual and Cultural Beliefs   Screening  each visit or episode of care    Falls Risk Screening every ambulatory visit    Pain Screening annually at primary care visit  10/28/2024   Domestic Violence screening annually at primary care visit    Depression Screening annually in the primary care setting    Suicide Risk Screening annually in the primary care setting    Nutrition and Food Insecurity   Screening at least annually at primary care visit     Key Learner annually in the primary care setting    Drug Screen  1/13/2025 10:16 AM   Alcohol Screen  1/13/2025 10:16 AM   Advance Directive       Time Calculation  Start Time: 0850  Stop Time: 0935  Time Calculation (min): 45 min    PT Therapeutic Procedures Time Entry  Manual Therapy Time Entry: 23  Therapeutic Exercise Time Entry: 17

## 2025-02-26 ENCOUNTER — TREATMENT (OUTPATIENT)
Dept: PHYSICAL THERAPY | Facility: CLINIC | Age: 20
End: 2025-02-26
Payer: COMMERCIAL

## 2025-02-26 DIAGNOSIS — M25.671 STIFFNESS OF RIGHT ANKLE JOINT: ICD-10-CM

## 2025-02-26 DIAGNOSIS — S86.011D RUPTURE OF RIGHT ACHILLES TENDON, SUBSEQUENT ENCOUNTER: Primary | ICD-10-CM

## 2025-02-26 DIAGNOSIS — R29.898 ANKLE WEAKNESS: ICD-10-CM

## 2025-02-26 PROCEDURE — 97140 MANUAL THERAPY 1/> REGIONS: CPT | Mod: GP

## 2025-02-26 PROCEDURE — 97110 THERAPEUTIC EXERCISES: CPT | Mod: GP

## 2025-02-26 NOTE — PROGRESS NOTES
"Physical Therapy  Physical Therapy Treatment    Patient Name: Bran Yang  MRN: [id number]  Today's Date: 2/26/2025    Insurance:  Visit number: 10 of MN  Authorization info: No  Insurance Type: Payor: [facility] / Plan: [employer] / Product Type: *No Product type* /    Current Problem  Problem List Items Addressed This Visit             ICD-10-CM    Achilles rupture, right - Primary S86.011A    Stiffness of right ankle joint M25.671    Ankle weakness R29.898     General:  General  Reason for Referral: R achilles repair  Referred By: Dr. Ha  General Comment: DOS: 11/7/24  Precautions:     Medical History Form: Reviewed (scanned into chart)    Subjective:   Patient continues to feel like he is making good progress with his ankle/ Achilles rehab  Objective:     Pain: 2-3, more sharp today from walking outside.    Palpation / Observation   Slight increase in tenderness upon palpation of R achilles tendon. Incision healing nicely. No swelling noted. Pt ambulating out of boot with antalgic gait pattern.     Ankle ROM   DF- R: 2-3 L: 20 updated 1/23 post session  PF- R: 30 L: 30  Eversion - R: 15 L: 20   Inversion - R: 20 L: 30    Ankle MMT  DF- R: Not tested due to recent surgery L: 5/5  PF- R: Not tested due to recent surgery L: 5/5  Eversion - R: Not tested due to recent surgery L: 5/5   Inversion - R: Not tested due to recent surgery L: 5/5    Outcome Measures:        Treatments:    Manual Therapy: 25'  STM gastroc/soleus  Trp gastroc/soleus  TCJ grade 3-4 AP glides   TCJ distraction  Achilles motility to tolerance    There-ex: 15'  Upright bike x5'   Reverse slider 3 x 10   BL heel raise with UE assist 2 x 10   TRX SL heel raise 2 x 10   Soleus raise on 18\" box 2 x 10  DF stretch on 18\" box 3 x 10       * = added to HEP.  Sheet with exercise descriptions and images issued.  Skilled intervention utilized in the appropriate selection & application of above exercises.  Verbal and " tactile cues provided for proper form and technique.  Pt. demonstrated appropriate form & verbalized understanding of optimal technique for above exercises.      Access Code: [email or web address]  URL: [email or web address]  Date: 01/15/2025  Prepared by: Tigre Kwong    EDUCATION: Home exercise program, plan of care, activity modifications, pain management, and injury pathology       Assessment:   Patient tolerated today's session well. Pt is still struggling to put full weight through R ankle when performing heel raises. Pt's ROM has continued to show that it is making good progress. Pt continues to progress towards goals established in the POC and should continue with skilled therapy.      Plan:   Continue manual and mobility work. Progress balance and hip strengthening activities as tolerated. Reassess patellar tendon pain and response to isometrics.     Goals: Set and discussed today  Active       PT Problem       PT Goals       Start:  01/15/25       1.  Patient will increase LEFS to 65 out of 80 or greater in order to demonstrate an increase in ankle function  2.  Patient will increase all hip and ankle manual muscle tests to 4+ out of 5 or greater in order to demonstrate an increase in lower extremity strength.  3.  Patient will increase right ankle ROM to be equal to or greater than the L ankle in order to demonstrate an increase in functional mobility.  4.  Patient will be able to perform 15 or more heel raises without upper extremity support in order to demonstrate an increase in functional strength.  5.  Patient will be able to walk 0.5 of a mile with no increase in right ankle/achilles pain in order to demonstrate progress towards prior level of function.  6.  Patient will perform home exercise program independently and compliantly               Plan of care was developed with input and agreement by the patient  Ambulatory Screenings Summary       Screening  Frequency  Date Last Completed    Spiritual and Cultural Beliefs   Screening each visit or episode of care    Falls Risk Screening every ambulatory visit    Pain Screening annually at primary care visit  10/28/2024   Domestic Violence screening annually at primary care visit    Depression Screening annually in the primary care setting    Suicide Risk Screening annually in the primary care setting    Nutrition and Food Insecurity   Screening at least annually at primary care visit     Key Learner annually in the primary care setting    Drug Screen  1/13/2025 10:16 AM   Alcohol Screen  1/13/2025 10:16 AM   Advance Directive       Time Calculation  Start Time: 1515  Stop Time: 1600  Time Calculation (min): 45 min    PT Therapeutic Procedures Time Entry  Manual Therapy Time Entry: 15  Therapeutic Exercise Time Entry: 25

## 2025-03-07 ENCOUNTER — TREATMENT (OUTPATIENT)
Dept: PHYSICAL THERAPY | Facility: CLINIC | Age: 20
End: 2025-03-07
Payer: COMMERCIAL

## 2025-03-07 DIAGNOSIS — S86.011D RUPTURE OF RIGHT ACHILLES TENDON, SUBSEQUENT ENCOUNTER: Primary | ICD-10-CM

## 2025-03-07 DIAGNOSIS — R29.898 ANKLE WEAKNESS: ICD-10-CM

## 2025-03-07 DIAGNOSIS — M25.671 STIFFNESS OF RIGHT ANKLE JOINT: ICD-10-CM

## 2025-03-07 PROCEDURE — 97110 THERAPEUTIC EXERCISES: CPT | Mod: GP

## 2025-03-07 ASSESSMENT — PAIN - FUNCTIONAL ASSESSMENT: PAIN_FUNCTIONAL_ASSESSMENT: 0-10

## 2025-03-07 ASSESSMENT — PAIN SCALES - GENERAL: PAINLEVEL_OUTOF10: 0 - NO PAIN

## 2025-03-07 NOTE — PROGRESS NOTES
Physical Therapy  Physical Therapy Treatment    Patient Name: Bran Yang  MRN: [id number]  Today's Date: 3/7/2025    Insurance:  Visit number: 11 of MN  Authorization info: No  Insurance Type: Payor: [facility] / Plan: [employer] / Product Type: *No Product type* /    Current Problem  Problem List Items Addressed This Visit             ICD-10-CM    Achilles rupture, right - Primary S86.011A    Stiffness of right ankle joint M25.671    Ankle weakness R29.898     General:  General  Reason for Referral: R achilles repair  Referred By: Dr. Ha  General Comment: DOS: 11/7/24  Precautions:     Medical History Form: Reviewed (scanned into chart)    Subjective:   Patient reports he is doing well today. Feels ROM has improved but strength is limiting ADLs.     Objective:     Pain: 0/10    Palpation / Observation   Slight increase in tenderness upon palpation of R achilles tendon. Incision healing nicely. No swelling noted. Pt ambulating out of boot with antalgic gait pattern.     Ankle ROM   DF- R: 20 updated 3/7  PF- R: 60   Eversion - R: 10   Inversion - R: 35     Ankle HHD testing 3/7 in supine    DF- R: 41.7 L: 53.3  PF KE- R: 24.5, L: 31.7 KF- R: 20.8 L 38.2    LSI DF: 78%  LSI PF KE: 77%  LSI PF KF: 54.4%      Outcome Measures:        Treatments:    Manual Therapy: 0'      There-ex: 45'  Upright bike x5'   BFR LE strength protocol , WP 60%  Seated heel raises w/ 32# KB on knee 30-15-15-failure  Long sitting black TB PF 30-15-failure  Standing tib raises w/ BFR, discontinued  Toe walking in parallel bars w/ knees flexed 2x15, discontinued  Eccentric heel raises off step, up DL, down SL 2x8-10  Seated D1 ankle extension w/ green perform better TB 3x8-10    Access Code: [email or web address]  URL: [email or web address]  Date: 01/15/2025  Prepared by: Tigre Kwong    EDUCATION: Home exercise program, plan of care, activity modifications, pain management, and injury  pathology       Assessment:   Patient tolerated today's session w/ expected muscle fatigue and soreness. Did not respond well to BFR, became light headed and required exercises to be discontinued. Patient's ROM is great post-op, has severe soleus and moderate gastroc and tibialis anterior strength deficits. He will benefit from ongoing PT services to continue to progress LE strengthening activities as tolerated to reach established goals to return to PLOF.       Plan:   Continue manual and mobility work prn. Heavy soleus, gastroc and tibialis anterior strengthening.     Goals: Set and discussed today  Active       PT Problem       PT Goals       Start:  01/15/25       1.  Patient will increase LEFS to 65 out of 80 or greater in order to demonstrate an increase in ankle function  2.  Patient will increase all hip and ankle manual muscle tests to 4+ out of 5 or greater in order to demonstrate an increase in lower extremity strength.  3.  Patient will increase right ankle ROM to be equal to or greater than the L ankle in order to demonstrate an increase in functional mobility.  4.  Patient will be able to perform 15 or more heel raises without upper extremity support in order to demonstrate an increase in functional strength.  5.  Patient will be able to walk 0.5 of a mile with no increase in right ankle/achilles pain in order to demonstrate progress towards prior level of function.  6.  Patient will perform home exercise program independently and compliantly               Plan of care was developed with input and agreement by the patient  Ambulatory Screenings Summary       Screening  Frequency  Date Last Completed   Spiritual and Cultural Beliefs   Screening each visit or episode of care    Falls Risk Screening every ambulatory visit    Pain Screening annually at primary care visit  10/28/2024   Domestic Violence screening annually at primary care visit    Depression Screening annually in the primary care setting     Suicide Risk Screening annually in the primary care setting    Nutrition and Food Insecurity   Screening at least annually at primary care visit     Key Learner annually in the primary care setting    Drug Screen  1/13/2025 10:16 AM   Alcohol Screen  1/13/2025 10:16 AM   Advance Directive       Time Calculation  Start Time: 1000  Stop Time: 1045  Time Calculation (min): 45 min    PT Therapeutic Procedures Time Entry  Therapeutic Exercise Time Entry: 45

## 2025-03-13 ENCOUNTER — APPOINTMENT (OUTPATIENT)
Dept: ORTHOPEDIC SURGERY | Facility: HOSPITAL | Age: 20
End: 2025-03-13
Payer: COMMERCIAL

## 2025-03-13 ENCOUNTER — TELEPHONE (OUTPATIENT)
Dept: ORTHOPEDIC SURGERY | Facility: HOSPITAL | Age: 20
End: 2025-03-13
Payer: COMMERCIAL

## 2025-03-13 NOTE — TELEPHONE ENCOUNTER
Copied from CRM #[id number]. Topic: Appointment Rescheduled  >> Mar 13, 2025 11:57 AM Mena GUTHRIE wrote:  Same day Cx/Appointment Rescheduled for Patient.

## 2025-03-20 ENCOUNTER — TREATMENT (OUTPATIENT)
Dept: PHYSICAL THERAPY | Facility: CLINIC | Age: 20
End: 2025-03-20
Payer: COMMERCIAL

## 2025-03-20 DIAGNOSIS — R29.898 ANKLE WEAKNESS: ICD-10-CM

## 2025-03-20 DIAGNOSIS — M25.671 STIFFNESS OF RIGHT ANKLE JOINT: ICD-10-CM

## 2025-03-20 DIAGNOSIS — S86.011D RUPTURE OF RIGHT ACHILLES TENDON, SUBSEQUENT ENCOUNTER: Primary | ICD-10-CM

## 2025-03-20 PROCEDURE — 97110 THERAPEUTIC EXERCISES: CPT | Mod: GP

## 2025-03-20 ASSESSMENT — PAIN SCALES - GENERAL: PAINLEVEL_OUTOF10: 0 - NO PAIN

## 2025-03-20 ASSESSMENT — PAIN - FUNCTIONAL ASSESSMENT: PAIN_FUNCTIONAL_ASSESSMENT: 0-10

## 2025-03-20 NOTE — PROGRESS NOTES
Physical Therapy  Physical Therapy Treatment    Patient Name: Bran Yang  MRN: [id number]  Today's Date: 3/20/2025    Insurance:  Visit number: 12 of MN  Authorization info: No  Insurance Type: Payor: [facility] / Plan: [employer] / Product Type: *No Product type* /    Current Problem  Problem List Items Addressed This Visit             ICD-10-CM    Achilles rupture, right - Primary S86.011A    Stiffness of right ankle joint M25.671    Ankle weakness R29.898     General:  General  Reason for Referral: R achilles repair  Referred By: Dr. Ha  General Comment: DOS: 11/7/24  Precautions:     Medical History Form: Reviewed (scanned into chart)    Subjective:   Patient reports he is feeling pretty good overall. No issues since last visit.     Objective:     Pain: 0/10    Palpation / Observation   Slight increase in tenderness upon palpation of R achilles tendon. Incision healing nicely. No swelling noted. Pt ambulating out of boot with antalgic gait pattern.     Ankle ROM   DF- R: 20 updated 3/7  PF- R: 60   Eversion - R: 10   Inversion - R: 35     Ankle HHD testing 3/7 in supine    DF- R: 41.7 L: 53.3  PF KE- R: 24.5, L: 31.7 KF- R: 20.8 L 38.2    LSI DF: 78%  LSI PF KE: 77%  LSI PF KF: 54.4%      Outcome Measures:        Treatments:    Manual Therapy: 0'      There-ex: 39'  Upright bike x5'   [place] split squat iso hold heel raise 3x10*  Seated tib raises w/ 20# KB on foot 3x7-10*  Standing hip abduction w/ green perform better TB on wedge 3x12 B  Heel raises on step w/ 35# DB 3x8-10*  Soleus walks in parallel bars 3x8*    Access Code: [email or web address]  URL: [email or web address]  Date: 01/15/2025  Prepared by: Tigre Kwong    EDUCATION: Home exercise program, plan of care, activity modifications, pain management, and injury pathology       Assessment:   Patient tolerated today's session w/ expected muscle fatigue. Demonstrates improvements in gastroc/soleus  strength displayed through progression of there-ex w/o adverse reaction. Had difficulty w/ weighted heel raises and soleus walks due to fatigue at end of session. Patient is doing well overall s/p achilles repair and will benefit from ongoing PT services to continue to progress gastroc/soleus strengthening activities as tolerated to reach established goals to return to PLOF.         Plan:   Continue manual and mobility work prn. Heavy soleus, gastroc and tibialis anterior strengthening.     Goals: Set and discussed today  Active       PT Problem       PT Goals       Start:  01/15/25       1.  Patient will increase LEFS to 65 out of 80 or greater in order to demonstrate an increase in ankle function  2.  Patient will increase all hip and ankle manual muscle tests to 4+ out of 5 or greater in order to demonstrate an increase in lower extremity strength.  3.  Patient will increase right ankle ROM to be equal to or greater than the L ankle in order to demonstrate an increase in functional mobility.  4.  Patient will be able to perform 15 or more heel raises without upper extremity support in order to demonstrate an increase in functional strength.  5.  Patient will be able to walk 0.5 of a mile with no increase in right ankle/achilles pain in order to demonstrate progress towards prior level of function.  6.  Patient will perform home exercise program independently and compliantly               Plan of care was developed with input and agreement by the patient  Ambulatory Screenings Summary       Screening  Frequency  Date Last Completed   Spiritual and Cultural Beliefs   Screening each visit or episode of care    Falls Risk Screening every ambulatory visit    Pain Screening annually at primary care visit  10/28/2024   Domestic Violence screening annually at primary care visit    Depression Screening annually in the primary care setting    Suicide Risk Screening annually in the primary care setting    Nutrition and Food  Insecurity   Screening at least annually at primary care visit     Key Learner annually in the primary care setting    Drug Screen  1/13/2025 10:16 AM   Alcohol Screen  1/13/2025 10:16 AM   Advance Directive       Time Calculation  Start Time: 0830  Stop Time: 0909  Time Calculation (min): 39 min    PT Therapeutic Procedures Time Entry  Therapeutic Exercise Time Entry: 39

## 2025-03-27 ENCOUNTER — APPOINTMENT (OUTPATIENT)
Dept: PHYSICAL THERAPY | Facility: CLINIC | Age: 20
End: 2025-03-27
Payer: COMMERCIAL

## 2025-03-27 DIAGNOSIS — S86.011D RUPTURE OF RIGHT ACHILLES TENDON, SUBSEQUENT ENCOUNTER: Primary | ICD-10-CM

## 2025-03-27 DIAGNOSIS — M25.671 STIFFNESS OF RIGHT ANKLE JOINT: ICD-10-CM

## 2025-03-27 DIAGNOSIS — R29.898 ANKLE WEAKNESS: ICD-10-CM
